# Patient Record
Sex: MALE | Race: WHITE | NOT HISPANIC OR LATINO | Employment: FULL TIME | URBAN - METROPOLITAN AREA
[De-identification: names, ages, dates, MRNs, and addresses within clinical notes are randomized per-mention and may not be internally consistent; named-entity substitution may affect disease eponyms.]

---

## 2019-04-15 ENCOUNTER — HOSPITAL ENCOUNTER (EMERGENCY)
Facility: MEDICAL CENTER | Age: 44
End: 2019-04-16
Attending: EMERGENCY MEDICINE
Payer: COMMERCIAL
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
## 2019-04-15 LAB
ALBUMIN SERPL BCP-MCNC: 3.9 G/DL (ref 3.2–4.9)
ALBUMIN/GLOB SERPL: 1.7 G/DL
ALP SERPL-CCNC: 57 U/L (ref 30–99)
ALT SERPL-CCNC: 27 U/L (ref 2–50)
ANION GAP SERPL CALC-SCNC: 10 MMOL/L (ref 0–11.9)
AST SERPL-CCNC: 23 U/L (ref 12–45)
BASOPHILS # BLD AUTO: 1.5 % (ref 0–1.8)
BASOPHILS # BLD: 0.15 K/UL (ref 0–0.12)
BILIRUB SERPL-MCNC: 0.4 MG/DL (ref 0.1–1.5)
BUN SERPL-MCNC: 9 MG/DL (ref 8–22)
CALCIUM SERPL-MCNC: 8.9 MG/DL (ref 8.5–10.5)
CHLORIDE SERPL-SCNC: 107 MMOL/L (ref 96–112)
CO2 SERPL-SCNC: 23 MMOL/L (ref 20–33)
CREAT SERPL-MCNC: 0.8 MG/DL (ref 0.5–1.4)
EOSINOPHIL # BLD AUTO: 0.18 K/UL (ref 0–0.51)
EOSINOPHIL NFR BLD: 1.7 % (ref 0–6.9)
ERYTHROCYTE [DISTWIDTH] IN BLOOD BY AUTOMATED COUNT: 50.8 FL (ref 35.9–50)
GLOBULIN SER CALC-MCNC: 2.3 G/DL (ref 1.9–3.5)
GLUCOSE SERPL-MCNC: 90 MG/DL (ref 65–99)
HCT VFR BLD AUTO: 52.3 % (ref 42–52)
HGB BLD-MCNC: 17.7 G/DL (ref 14–18)
IMM GRANULOCYTES # BLD AUTO: 0.03 K/UL (ref 0–0.11)
IMM GRANULOCYTES NFR BLD AUTO: 0.3 % (ref 0–0.9)
LYMPHOCYTES # BLD AUTO: 3.34 K/UL (ref 1–4.8)
LYMPHOCYTES NFR BLD: 32.4 % (ref 22–41)
MCH RBC QN AUTO: 33.5 PG (ref 27–33)
MCHC RBC AUTO-ENTMCNC: 33.8 G/DL (ref 33.7–35.3)
MCV RBC AUTO: 98.9 FL (ref 81.4–97.8)
MONOCYTES # BLD AUTO: 0.9 K/UL (ref 0–0.85)
MONOCYTES NFR BLD AUTO: 8.7 % (ref 0–13.4)
NEUTROPHILS # BLD AUTO: 5.71 K/UL (ref 1.82–7.42)
NEUTROPHILS NFR BLD: 55.4 % (ref 44–72)
NRBC # BLD AUTO: 0 K/UL
NRBC BLD-RTO: 0 /100 WBC
PLATELET # BLD AUTO: 229 K/UL (ref 164–446)
PMV BLD AUTO: 11.4 FL (ref 9–12.9)
POTASSIUM SERPL-SCNC: 3.6 MMOL/L (ref 3.6–5.5)
PROT SERPL-MCNC: 6.2 G/DL (ref 6–8.2)
RBC # BLD AUTO: 5.29 M/UL (ref 4.7–6.1)
SODIUM SERPL-SCNC: 140 MMOL/L (ref 135–145)
TROPONIN I SERPL-MCNC: <0.01 NG/ML (ref 0–0.04)
WBC # BLD AUTO: 10.3 K/UL (ref 4.8–10.8)

## 2019-04-15 PROCEDURE — 700102 HCHG RX REV CODE 250 W/ 637 OVERRIDE(OP): Performed by: EMERGENCY MEDICINE

## 2019-04-15 PROCEDURE — 700105 HCHG RX REV CODE 258: Performed by: EMERGENCY MEDICINE

## 2019-04-15 PROCEDURE — 80053 COMPREHEN METABOLIC PANEL: CPT

## 2019-04-15 PROCEDURE — A9270 NON-COVERED ITEM OR SERVICE: HCPCS | Performed by: EMERGENCY MEDICINE

## 2019-04-15 PROCEDURE — 93005 ELECTROCARDIOGRAM TRACING: CPT | Performed by: EMERGENCY MEDICINE

## 2019-04-15 PROCEDURE — 84484 ASSAY OF TROPONIN QUANT: CPT

## 2019-04-15 PROCEDURE — 85025 COMPLETE CBC W/AUTO DIFF WBC: CPT

## 2019-04-15 PROCEDURE — 99284 EMERGENCY DEPT VISIT MOD MDM: CPT

## 2019-04-15 RX ORDER — CEPHALEXIN 500 MG/1
500 CAPSULE ORAL ONCE
Status: COMPLETED | OUTPATIENT
Start: 2019-04-15 | End: 2019-04-15

## 2019-04-15 RX ORDER — CEPHALEXIN 500 MG/1
500 CAPSULE ORAL 4 TIMES DAILY
Qty: 40 CAP | Refills: 0 | Status: SHIPPED
Start: 2019-04-15 | End: 2020-02-04

## 2019-04-15 RX ORDER — SODIUM CHLORIDE 9 MG/ML
1000 INJECTION, SOLUTION INTRAVENOUS ONCE
Status: COMPLETED | OUTPATIENT
Start: 2019-04-15 | End: 2019-04-16

## 2019-04-15 RX ADMIN — CEPHALEXIN 500 MG: 500 CAPSULE ORAL at 23:07

## 2019-04-15 RX ADMIN — SODIUM CHLORIDE 1000 ML: 9 INJECTION, SOLUTION INTRAVENOUS at 23:07

## 2019-04-16 VITALS
HEART RATE: 70 BPM | BODY MASS INDEX: 25.74 KG/M2 | DIASTOLIC BLOOD PRESSURE: 62 MMHG | WEIGHT: 190.04 LBS | RESPIRATION RATE: 22 BRPM | OXYGEN SATURATION: 99 % | SYSTOLIC BLOOD PRESSURE: 104 MMHG | HEIGHT: 72 IN | TEMPERATURE: 96.6 F

## 2019-04-16 NOTE — ED NOTES
piv estb. Labs drawn & sent. Tolerated well. Flushed c 10cc ns. Family at bs. Tbs. Call light in reach.

## 2019-04-16 NOTE — ED TRIAGE NOTES
Pardeep Byrd  43 y.o.  Chief Complaint   Patient presents with   • Bug Bite     all over body, arms, legs and back and chest.  started last night.     • Malaise     has been feeling weak all day, had trouble standing up on own.      Patient wheeled in wc with son to triage room with above complaint.  Patient appears in mild discomfort.  Patient states he does not know what he was bit by but he did stay at a friends house and thinks that is where it came from.  Denies any bed bugs or scabies.  States the red raised bumps have been draining yellow drainage.  States he feels weak and nauseated from these bites.      Patient escorted to the lobby and instructed to notify staff of any changes in condition.

## 2019-04-16 NOTE — ED NOTES
piv dc. Bleeding controlled. Given dc inst & rx. verbalizes understanding. Ambulatory to lobby c steady gait c family.

## 2019-04-16 NOTE — ED PROVIDER NOTES
ED Provider Note    CHIEF COMPLAINT  Chief Complaint   Patient presents with   • Bug Bite     all over body, arms, legs and back and chest.  started last night.     • Malaise     has been feeling weak all day, had trouble standing up on own.        HPI  Pardeep Byrd is a 43 y.o. male here for evaluation of various bug bites, and general malaise.  Patient states that he noticed the bug bites earlier today after he woke up, and states that he has 4 5 on his back, 1 or 2 on his right arm, and a few scattered on his left arm.  Patient has no chest pain, shortness breath, or abdominal pain.  He states that these raised areas will itch, but that he has not taken anything for them.  Nothing alleviates or exacerbates his symptoms, and they are not painful.  Patient has not used any over-the-counter remedies as well.  He has no ill contacts, and no history of the same.    PAST MEDICAL HISTORY   has a past medical history of PUD (peptic ulcer disease).    SOCIAL HISTORY  Social History     Social History Main Topics   • Smoking status: Current Every Day Smoker     Packs/day: 1.00     Types: Cigarettes   • Smokeless tobacco: Former User   • Alcohol use Yes      Comment: drinks beer on the weekends   • Drug use: Yes     Types: Inhaled      Comment: marijuana   • Sexual activity: Not on file       SURGICAL HISTORY   has a past surgical history that includes other orthopedic surgery.    CURRENT MEDICATIONS  Home Medications     Reviewed by William Smith R.N. (Registered Nurse) on 04/15/19 at 2076  Med List Status: Complete   Medication Last Dose Status        Patient Yamil Taking any Medications                       ALLERGIES  Allergies   Allergen Reactions   • Bee    • Milk Digestant [Lactase-Rennet]        REVIEW OF SYSTEMS  See HPI for further details. Review of systems as above, otherwise all other systems are negative.     PHYSICAL EXAM  VITAL SIGNS: /62   Pulse 70   Temp 35.9 °C (96.6 °F)  (Temporal)   Resp (!) 22   Ht 1.829 m (6')   Wt 86.2 kg (190 lb 0.6 oz)   SpO2 99%   BMI 25.77 kg/m²     Constitutional: Well developed, well nourished. No acute distress.  HEENT: Normocephalic, atraumatic. MMM  Neck: Supple, Full range of motion   Chest/Pulmonary:  No respiratory distress.  Equal expansion   Musculoskeletal: No deformity, no edema, neurovascular intact.   Neuro: Clear speech, appropriate, cooperative, cranial nerves II-XII grossly intact.  Psych: Normal mood and affect  Skin; on the back, right arm, left arm, there are 3-5 2 cm raised erythematous areas, without vesicles.  Pruritic.  No extending erythema.  No induration.  No fluctuance    Results for orders placed or performed during the hospital encounter of 04/15/19   CBC WITH DIFFERENTIAL   Result Value Ref Range    WBC 10.3 4.8 - 10.8 K/uL    RBC 5.29 4.70 - 6.10 M/uL    Hemoglobin 17.7 14.0 - 18.0 g/dL    Hematocrit 52.3 (H) 42.0 - 52.0 %    MCV 98.9 (H) 81.4 - 97.8 fL    MCH 33.5 (H) 27.0 - 33.0 pg    MCHC 33.8 33.7 - 35.3 g/dL    RDW 50.8 (H) 35.9 - 50.0 fL    Platelet Count 229 164 - 446 K/uL    MPV 11.4 9.0 - 12.9 fL    Neutrophils-Polys 55.40 44.00 - 72.00 %    Lymphocytes 32.40 22.00 - 41.00 %    Monocytes 8.70 0.00 - 13.40 %    Eosinophils 1.70 0.00 - 6.90 %    Basophils 1.50 0.00 - 1.80 %    Immature Granulocytes 0.30 0.00 - 0.90 %    Nucleated RBC 0.00 /100 WBC    Neutrophils (Absolute) 5.71 1.82 - 7.42 K/uL    Lymphs (Absolute) 3.34 1.00 - 4.80 K/uL    Monos (Absolute) 0.90 (H) 0.00 - 0.85 K/uL    Eos (Absolute) 0.18 0.00 - 0.51 K/uL    Baso (Absolute) 0.15 (H) 0.00 - 0.12 K/uL    Immature Granulocytes (abs) 0.03 0.00 - 0.11 K/uL    NRBC (Absolute) 0.00 K/uL   COMP METABOLIC PANEL   Result Value Ref Range    Sodium 140 135 - 145 mmol/L    Potassium 3.6 3.6 - 5.5 mmol/L    Chloride 107 96 - 112 mmol/L    Co2 23 20 - 33 mmol/L    Anion Gap 10.0 0.0 - 11.9    Glucose 90 65 - 99 mg/dL    Bun 9 8 - 22 mg/dL    Creatinine 0.80 0.50 -  1.40 mg/dL    Calcium 8.9 8.5 - 10.5 mg/dL    AST(SGOT) 23 12 - 45 U/L    ALT(SGPT) 27 2 - 50 U/L    Alkaline Phosphatase 57 30 - 99 U/L    Total Bilirubin 0.4 0.1 - 1.5 mg/dL    Albumin 3.9 3.2 - 4.9 g/dL    Total Protein 6.2 6.0 - 8.2 g/dL    Globulin 2.3 1.9 - 3.5 g/dL    A-G Ratio 1.7 g/dL   TROPONIN   Result Value Ref Range    Troponin I <0.01 0.00 - 0.04 ng/mL   ESTIMATED GFR   Result Value Ref Range    GFR If African American >60 >60 mL/min/1.73 m 2    GFR If Non African American >60 >60 mL/min/1.73 m 2   EKG   Result Value Ref Range    Report       [facility] Emergency Dept.    Test Date:  2019-04-15  Pt Name:    FARAZ OH             Department: ER  MRN:        [id number]                      Room:       [facility]  Gender:     Male                         Technician: [id number]  :        1975                   Requested By:SHAN DASILVA  Order #:    [id number]                    Reading MD:    Measurements  Intervals                                Axis  Rate:       65                           P:          73  OK:         168                          QRS:        26  QRSD:       102                          T:          51  QT:         420  QTc:        437    Interpretive Statements  SINUS RHYTHM  LEFT ATRIAL ABNORMALITY  No previous ECG available for comparison        No orders to display     EKG; normal sinus rhythm at a rate of 65, no ST elevation or ST depression, no ectopy.  Normal intervals.    PROCEDURES     MEDICAL RECORD  I have reviewed patient's medical record and pertinent results are listed above.    COURSE & MEDICAL DECISION MAKING  I have reviewed any medical record information, laboratory studies and radiographic results as noted above.    I you have had any blood pressure issues while here in the emergency department, please see your doctor for a further evaluation or work up.    At this time, the patient be discharged home, and will be placed on antibiotics secondary  to the multiple bites with the various redness.  He is nontoxic-appearing, afebrile, and appears comfortable.  A workup was done regarding blood work and EKG because he felt general malaise and just not feeling well.  This shows no acute findings.    Differential diagnoses include but not limited to: Insect envenomation, cellulitis, MI,    This patient presents with insect envenomation.  At this time, I have counseled the patient/family regarding their medications, pain control, and follow up.  They will continue their medications, if any, as prescribed.  They will return immediately for any worsening symptoms and/or any other medical concerns.  They will see their doctor, or contact the doctor provided, in 1-2 days for follow up.       FINAL IMPRESSION  Insect envenomation      Electronically signed by: Johnnie Henriquez, 4/16/2019 12:27 AM

## 2019-04-16 NOTE — ED NOTES
Pt from triage via w/c. States sudden onset of malaise yesterday & sporatic bug bites after staying at friends house yesterday. Denies anyone else in house c similar bites. Changed into gown. Family at bs. Tbs.

## 2019-04-22 LAB — EKG IMPRESSION: NORMAL

## 2020-02-04 ENCOUNTER — HOSPITAL ENCOUNTER (EMERGENCY)
Facility: MEDICAL CENTER | Age: 45
End: 2020-02-04
Attending: EMERGENCY MEDICINE
Payer: COMMERCIAL

## 2020-02-04 ENCOUNTER — APPOINTMENT (OUTPATIENT)
Dept: RADIOLOGY | Facility: MEDICAL CENTER | Age: 45
End: 2020-02-04
Attending: EMERGENCY MEDICINE
Payer: COMMERCIAL

## 2020-02-04 VITALS
HEIGHT: 72 IN | TEMPERATURE: 97.9 F | BODY MASS INDEX: 24.84 KG/M2 | SYSTOLIC BLOOD PRESSURE: 115 MMHG | WEIGHT: 183.42 LBS | DIASTOLIC BLOOD PRESSURE: 77 MMHG | OXYGEN SATURATION: 100 % | HEART RATE: 95 BPM | RESPIRATION RATE: 20 BRPM

## 2020-02-04 DIAGNOSIS — M54.6 ACUTE MIDLINE THORACIC BACK PAIN: ICD-10-CM

## 2020-02-04 LAB
ANION GAP SERPL CALC-SCNC: 13 MMOL/L (ref 0–11.9)
BUN SERPL-MCNC: 9 MG/DL (ref 8–22)
CALCIUM SERPL-MCNC: 9.6 MG/DL (ref 8.5–10.5)
CHLORIDE SERPL-SCNC: 103 MMOL/L (ref 96–112)
CO2 SERPL-SCNC: 21 MMOL/L (ref 20–33)
CREAT SERPL-MCNC: 0.89 MG/DL (ref 0.5–1.4)
GLUCOSE SERPL-MCNC: 94 MG/DL (ref 65–99)
POTASSIUM SERPL-SCNC: 3.9 MMOL/L (ref 3.6–5.5)
SODIUM SERPL-SCNC: 137 MMOL/L (ref 135–145)

## 2020-02-04 PROCEDURE — A9270 NON-COVERED ITEM OR SERVICE: HCPCS | Performed by: EMERGENCY MEDICINE

## 2020-02-04 PROCEDURE — 72072 X-RAY EXAM THORAC SPINE 3VWS: CPT

## 2020-02-04 PROCEDURE — 700102 HCHG RX REV CODE 250 W/ 637 OVERRIDE(OP): Performed by: EMERGENCY MEDICINE

## 2020-02-04 PROCEDURE — 99283 EMERGENCY DEPT VISIT LOW MDM: CPT

## 2020-02-04 PROCEDURE — 80048 BASIC METABOLIC PNL TOTAL CA: CPT

## 2020-02-04 RX ORDER — CYCLOBENZAPRINE HCL 10 MG
10 TABLET ORAL ONCE
Status: COMPLETED | OUTPATIENT
Start: 2020-02-04 | End: 2020-02-04

## 2020-02-04 RX ORDER — IBUPROFEN 200 MG
200 TABLET ORAL EVERY 6 HOURS PRN
COMMUNITY
End: 2020-05-03

## 2020-02-04 RX ORDER — CYCLOBENZAPRINE HCL 10 MG
10 TABLET ORAL 3 TIMES DAILY PRN
Qty: 30 TAB | Refills: 0 | Status: SHIPPED | OUTPATIENT
Start: 2020-02-04 | End: 2020-05-03

## 2020-02-04 RX ORDER — OXYCODONE HYDROCHLORIDE 5 MG/1
5 TABLET ORAL ONCE
Status: COMPLETED | OUTPATIENT
Start: 2020-02-04 | End: 2020-02-04

## 2020-02-04 RX ADMIN — CYCLOBENZAPRINE 10 MG: 10 TABLET, FILM COATED ORAL at 16:12

## 2020-02-04 RX ADMIN — OXYCODONE HYDROCHLORIDE 5 MG: 5 TABLET ORAL at 16:12

## 2020-02-04 NOTE — ED NOTES
Patient reports that he's been having ongoing back pain for the last month. No specific back injury, but is a retired .

## 2020-02-04 NOTE — ED TRIAGE NOTES
.  Chief Complaint   Patient presents with   • Back Pain     mid back pain x 1 month     ./77   Pulse 95   Temp 36.6 °C (97.9 °F) (Temporal)   Resp 20   Ht 1.829 m (6')   Wt 83.2 kg (183 lb 6.8 oz)   SpO2 100%   BMI 24.88 kg/m²     Ambulatory to triage with above complaints, taking Advil at home with no relief, educated on triage process, placed in lobby, told to inform staff of any changes in condition.

## 2020-02-05 NOTE — ED PROVIDER NOTES
ED Provider Note    Scribed for Chuckie Sylvester M.D. by Db Martínez. 2/4/2020  4:03 PM    Primary care provider: Pcp Pt States None  Means of arrival: Walk-in  History obtained from: Patient  History limited by: None    CHIEF COMPLAINT  Chief Complaint   Patient presents with   • Back Pain     mid back pain x 1 month       HPI  Pardeep Byrd is a 44 y.o. male who presents to the Emergency Department complaining of back pain that began about one month ago and worsened over the last week. He believes the pain is related to lifting heavy objects. He has been ambulating about in a hunched position. He has been taking ibuprofen for the pain and last took 3200 mg this morning and states he has been taking up to 6400 mg a day. He denies any abdominal pain, hematuria, diarrhea, or constipation.    REVIEW OF SYSTEMS  Pertinent positives include back pain. Pertinent negatives include no abdominal pain, hematuria, diarrhea, or constipation. All other systems negative.    PAST MEDICAL HISTORY   has a past medical history of PUD (peptic ulcer disease).    SURGICAL HISTORY   has a past surgical history that includes other orthopedic surgery.    SOCIAL HISTORY  Social History     Tobacco Use   • Smoking status: Current Every Day Smoker     Packs/day: 1.00     Types: Cigarettes   • Smokeless tobacco: Former User   Substance Use Topics   • Alcohol use: Yes     Comment: drinks beer on the weekends   • Drug use: Yes     Types: Inhaled     Comment: marijuana      Social History     Substance and Sexual Activity   Drug Use Yes   • Types: Inhaled    Comment: marijuana       FAMILY HISTORY  History reviewed. No pertinent family history.    CURRENT MEDICATIONS  Home Medications     Reviewed by Rosario Sylvester R.N. (Registered Nurse) on 02/04/20 at 1539  Med List Status: Partial   Medication Last Dose Status   ibuprofen (MOTRIN) 200 MG Tab  Active                ALLERGIES  Allergies   Allergen Reactions   • Bee     • Milk Digestant [Lactase-Rennet]        PHYSICAL EXAM  VITAL SIGNS: /77   Pulse 95   Temp 36.6 °C (97.9 °F) (Temporal)   Resp 20   Ht 1.829 m (6')   Wt 83.2 kg (183 lb 6.8 oz)   SpO2 100%   BMI 24.88 kg/m²     Constitutional: Well developed, Well nourished, Mild distress, Non-toxic appearance.   HENT: Normocephalic, Atraumatic, Bilateral external ears normal, Oropharynx moist, No oral exudates.   Eyes: PERRLA, EOMI, Conjunctiva normal, No discharge.   Neck: No tenderness, Supple, No stridor.   Lymphatic: No lymphadenopathy noted.   Cardiovascular: Normal heart rate, Normal rhythm.   Thorax & Lungs: Clear to auscultation bilaterally, No respiratory distress, No wheezing, No crackles.   Skin: Warm, Dry, No erythema, No rash.   Extremities:, No edema No cyanosis.   Musculoskeletal: No Diffuse tenderness throughout the lower T spine and paraspinal musculature. major deformities noted.  Intact distal pulses  Neurologic: Awake, alert. Moves all extremities spontaneously.  Psychiatric: Affect normal, Judgment normal, Mood normal.       LABS  Results for orders placed or performed during the hospital encounter of 02/04/20   Basic Metabolic Panel   Result Value Ref Range    Sodium 137 135 - 145 mmol/L    Potassium 3.9 3.6 - 5.5 mmol/L    Chloride 103 96 - 112 mmol/L    Co2 21 20 - 33 mmol/L    Glucose 94 65 - 99 mg/dL    Bun 9 8 - 22 mg/dL    Creatinine 0.89 0.50 - 1.40 mg/dL    Calcium 9.6 8.5 - 10.5 mg/dL    Anion Gap 13.0 (H) 0.0 - 11.9   ESTIMATED GFR   Result Value Ref Range    GFR If African American >60 >60 mL/min/1.73 m 2    GFR If Non African American >60 >60 mL/min/1.73 m 2         RADIOLOGY  DX-THORACIC SPINE-WITH SWIMMERS VIEW   Final Result         1.  No fracture      2.  Mild scoliosis and spondylosis        The radiologist's interpretation of all radiological studies have been reviewed by me.    COURSE & MEDICAL DECISION MAKING  Nursing notes, VS, PMSFHx reviewed in chart.    4:03 PM - Patient  seen and examined at bedside. Patient will be treated with cyclobenzaprine 10 mg and Roxicodone 5 mg. Ordered DX thoracic spine with swimmers view, estimated GFR, and BMP to evaluate his symptoms.    Decision Making:  Patient with mid thoracic back pain, likely muscle strain, has been taking ibuprofen, stated that he took ibuprofen, 3200 mg at once this morning.  BMP was unremarkable, give the patient in oxycodone, Flexeril, x-ray was unremarkable.  We will discharge the patient home, give the patient a prescription for Flexeril, have the patient return with worsening symptoms.      DISPOSITION:   The patient will return for new or worsening symptoms and is stable at the time of discharge.    The patient is referred to a primary physician for blood pressure management, diabetic screening, and for all other preventative health concerns.        DISPOSITION:  Patient will be discharged home in stable condition.    FOLLOW UP:  [facility], Emergency Dept  [address] [facility] [id number] [phone]    If symptoms worsen    [address] [address]  [phone]          OUTPATIENT MEDICATIONS:  Discharge Medication List as of 2/4/2020  5:34 PM      START taking these medications    Details   cyclobenzaprine (FLEXERIL) 10 MG Tab Take 1 Tab by mouth 3 times a day as needed., Disp-30 Tab, R-0, Print Rx Paper               FINAL IMPRESSION  1. Acute midline thoracic back pain          Db AGUILAR (Scribe), am scribing for, and in the presence of, Chuckie Sylvester M.D..    Electronically signed by: Db Martínez (Vandana), 2/4/2020    Chuckie AGUILAR M.D. personally performed the services described in this documentation, as scribed by Db Martínez in my presence, and it is both accurate and complete.    C    The note accurately reflects work and decisions made by me.  Chuckie Sylvester M.D.  2/4/2020  5:39 PM

## 2020-05-03 ENCOUNTER — HOSPITAL ENCOUNTER (OUTPATIENT)
Dept: RADIOLOGY | Facility: MEDICAL CENTER | Age: 45
End: 2020-05-03
Attending: FAMILY MEDICINE
Payer: COMMERCIAL

## 2020-05-03 ENCOUNTER — OFFICE VISIT (OUTPATIENT)
Dept: URGENT CARE | Facility: PHYSICIAN GROUP | Age: 45
End: 2020-05-03

## 2020-05-03 VITALS
DIASTOLIC BLOOD PRESSURE: 78 MMHG | BODY MASS INDEX: 23.7 KG/M2 | RESPIRATION RATE: 20 BRPM | SYSTOLIC BLOOD PRESSURE: 128 MMHG | TEMPERATURE: 98.7 F | HEART RATE: 100 BPM | WEIGHT: 175 LBS | HEIGHT: 72 IN | OXYGEN SATURATION: 95 %

## 2020-05-03 DIAGNOSIS — S49.92XA INJURY OF LEFT SHOULDER, INITIAL ENCOUNTER: ICD-10-CM

## 2020-05-03 DIAGNOSIS — M75.32 CALCIFIC TENDINITIS OF LEFT SHOULDER: ICD-10-CM

## 2020-05-03 PROCEDURE — 99203 OFFICE O/P NEW LOW 30 MIN: CPT | Performed by: FAMILY MEDICINE

## 2020-05-03 PROCEDURE — 73030 X-RAY EXAM OF SHOULDER: CPT | Mod: LT

## 2020-05-03 RX ORDER — IBUPROFEN 200 MG
800 TABLET ORAL ONCE
Status: COMPLETED | OUTPATIENT
Start: 2020-05-03 | End: 2020-05-03

## 2020-05-03 RX ADMIN — Medication 800 MG: at 13:36

## 2020-05-03 ASSESSMENT — FIBROSIS 4 INDEX: FIB4 SCORE: 0.87

## 2020-05-03 NOTE — PATIENT INSTRUCTIONS
On x-ray it showed you have calcific tendinitis which is a chronic condition and based on your recent injury would recommend further evaluation by sports medicine and referral was placed    Sling for comfort only  Ibuprofen 600 mg every 8 hours as needed for pain  Our coordinator should be in touch with you early next week for the sports medicine.  If you have not heard back from them by Wednesday call [phone]      Calcific Tendinitis  Calcific tendinitis occurs when crystals of calcium are deposited in a tendon. Tendons are tough, cord-like tissues that connect muscle to bone. Tendons are an important part of joints. They make joints move, and they absorb some of the stress that a joint receives during use. When calcium is deposited in the tendon, the tendon becomes stiff and painful and it can become swollen. Calcific tendinitis occurs frequently in a tendon in the shoulder joint (rotator cuff).  What are the causes?  The cause of calcific tendinitis is not known. It may be associated with:  · Overusing a tendon, such as from repetitive motion.  · Excess stress on the tendon.  · Age-related wear and tear.  · Repetitive, mild injuries.  What increases the risk?  This condition is more likely to develop in:  · People who do activities that involve repetitive motions.  · Older people.  What are the signs or symptoms?  This condition may or may not be painful. If there is pain, it may occur when moving the joint. Other symptoms may include:  · Tenderness when pressure is applied to the tendon.  · A snapping or popping sound when the joint moves.  · Decreased motion of the joint.  · Difficulty sleeping due to pain in the joint.  How is this diagnosed?  This condition is diagnosed with a physical exam. You may also have tests, such as:  · X-rays.  · MRI.  · CT scan.  How is this treated?  This condition generally gets better on its own. Treatment may also include:  · Resting, icing, applying pressure (compression),  and raising (elevating) the area above the level of your heart. This is known as RICE therapy.  · Medicines to help reduce inflammation or to help reduce pain.  · Physical therapy to strengthen and stretch the tendon.  · Following a specific exercise program to keep the joint working properly.  Treatment for more severe calcific tendinitis may require:  · Injecting steroids or pain-relieving medicines into or around the joint.  · Manipulating the joint after you are given medicine to numb the area (local anesthetic).  · Inflating the joint with sterile fluid to increase the flexibility of the tendons.  · Shock wave therapy, which involves focusing sound waves on the joint.  If other treatments do not work, surgery may be done to clean out the calcium deposits and repair the tendon as needed. Most people do not need surgery.  Follow these instructions at home:  Managing pain, stiffness, and swelling  · If directed, put heat on the affected area before you exercise or as often as told by your health care provider. Use the heat source that your health care provider recommends, such as a moist heat pack or a heating pad.  ¨ Place a towel between your skin and the heat source.  ¨ Leave the heat on for 20-30 minutes.  ¨ Remove the heat if your skin turns bright red. This is especially important if you are unable to feel pain, heat, or cold. You may have a greater risk of getting burned.  · Move the fingers or toes of the affected limb often, if this applies. This can help to prevent stiffness and lessen swelling.  · If directed, elevate the affected area above the level of your heart while you are sitting or lying down.  · If directed, put ice on the affected area:  ¨ Put ice in a plastic bag.  ¨ Place a towel between your skin and the bag.  ¨ Leave the ice on for 20 minutes, 2-3 times a day.  General instructions  · Take over-the-counter and prescription medicines only as told by your health care provider.  · Do not  drive or use heavy machinery while taking prescription pain medicine.  · Follow recommendations from your health care provider for activity and exercise. Ask your health care provider what activities are safe for you.  · Avoid using the affected area while you are experiencing symptoms of tendinitis.  · Wear an elastic bandage or compression wrap only as told by your health care provider.  · Keep all follow-up visits as told by your health care provider. This is important.  Contact a health care provider if:  · You have pain or numbness that gets worse.  · You develop new weakness.  · You notice increased joint stiffness or a sensation of looseness in the joint.  · You notice increasing redness, swelling, or warmth around the joint area.  Get help right away if:  · You have a fever for more than 2-3 days.  · You have symptoms for more than 2-3 days.  · You have a fever and your symptoms suddenly get worse.  This information is not intended to replace advice given to you by your health care provider. Make sure you discuss any questions you have with your health care provider.  Document Released: 09/26/2009 Document Revised: 09/11/2017 Document Reviewed: 09/11/2017  Elsevier Interactive Patient Education © 2017 [employer]
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
## 2020-11-11 ENCOUNTER — APPOINTMENT (OUTPATIENT)
Dept: RADIOLOGY | Facility: IMAGING CENTER | Age: 45
End: 2020-11-11
Attending: NURSE PRACTITIONER
Payer: COMMERCIAL

## 2020-11-11 ENCOUNTER — OCCUPATIONAL MEDICINE (OUTPATIENT)
Dept: URGENT CARE | Facility: CLINIC | Age: 45
End: 2020-11-11
Payer: COMMERCIAL

## 2020-11-11 VITALS
DIASTOLIC BLOOD PRESSURE: 82 MMHG | HEART RATE: 77 BPM | OXYGEN SATURATION: 97 % | BODY MASS INDEX: 26.41 KG/M2 | SYSTOLIC BLOOD PRESSURE: 144 MMHG | RESPIRATION RATE: 17 BRPM | TEMPERATURE: 97.7 F | HEIGHT: 72 IN | WEIGHT: 195 LBS

## 2020-11-11 DIAGNOSIS — M25.531 WRIST PAIN, ACUTE, RIGHT: ICD-10-CM

## 2020-11-11 DIAGNOSIS — S63.501A WRIST SPRAIN, RIGHT, INITIAL ENCOUNTER: ICD-10-CM

## 2020-11-11 PROCEDURE — 73110 X-RAY EXAM OF WRIST: CPT | Mod: TC,RT | Performed by: NURSE PRACTITIONER

## 2020-11-11 PROCEDURE — 99214 OFFICE O/P EST MOD 30 MIN: CPT | Performed by: NURSE PRACTITIONER

## 2020-11-11 RX ORDER — IBUPROFEN 200 MG
600 TABLET ORAL ONCE
OUTPATIENT
Start: 2020-11-11 | End: 2020-11-12

## 2020-11-11 ASSESSMENT — ENCOUNTER SYMPTOMS
BACK PAIN: 0
MYALGIAS: 1
NAUSEA: 0
FALLS: 0
CHILLS: 0
BRUISES/BLEEDS EASILY: 0
DIZZINESS: 0
VOMITING: 0
NECK PAIN: 0
FEVER: 0
HEADACHES: 0

## 2020-11-11 ASSESSMENT — FIBROSIS 4 INDEX: FIB4 SCORE: 0.89

## 2020-11-11 NOTE — LETTER
[facility] [address] [place] [address]  Phone:  [phone] - Fax:  [phone]   [facility] Progress Report and Disability Certification  Date of Service: 11/11/2020   No Show:  No  Date / Time of Next Visit: 11/18/2020 @ 8:00AM [facility]   Claim Information   Patient Name: Pardeep Byrd  Claim Number:     Employer:   [employer] Date of Injury: 11/11/2020     Insurer / TPA: Misc Unknown ID / SSN:     Occupation:   Diagnosis: Diagnoses of Wrist pain, acute, right and Wrist sprain, right, initial encounter were pertinent to this visit.    Medical Information   Related to Industrial Injury? Yes    Subjective Complaints:  Date of injury 11/11/2020. Pt presents for evaluation of a new work comp problem.  He states that he was putting the front end of a truck back on and pulled on a bolt holding sway bar.  He states when he pulled the bolt, the sway bar came down and smashed  his dorsal right wrist.  He notes immediate pain and inability to rotate right wrist.  Pain level currently is 7/10.  Negative for numbness or tingling.  He denies previous injury to this wrist.  Negative for her second job.   Objective Findings: usculoskeletal:      Right wrist: He exhibits decreased range of motion, tenderness, bony tenderness and swelling. He exhibits no effusion, no crepitus, no deformity and no laceration.      Comments: Positive for small superficial abrasion to right palm.  Negative for bleeding or drainage.   Pre-Existing Condition(s): none   Assessment:   Initial Visit    Status: Additional Care Required  Permanent Disability:No    Plan:      Diagnostics: X-ray    Comments:    DX wrist right: IMPRESSION:     No radiographic evidence of acute traumatic bone injury.    Disability Information   Status:      From:  11/11/2020  Through: 11/18/2020 Restrictions are: Temporary   Physical Restrictions   Sitting:    Standing:    Stooping:   Bending:      Squatting:    Walking:    Climbing:    Pushing:      Pulling:    Other:    Reaching Above Shoulder (L):   Reaching Above Shoulder (R):       Reaching Below Shoulder (L):    Reaching Below Shoulder (R):      Not to exceed Weight Limits   Carrying(hrs): 0  Comments:right upper extremity  Weight Limit(lb):   Comments:right upper extremety 0lbs Lifting(hrs):   Weight  Limit(lb):     Comments: Wrist brace provided in office.  We discussed supportive measures including resting, icing, compressing elevating right wrist for relief of pain. Pt instructed to use Tylenol 500 mg every four hours or Ibuprofen 600 mg every six hours as needed for relief of pain.  He was given 600 mg of ibuprofen in office.  Patient to follow-up in 1 week.  Work restrictions provided. AVS handout given and reviewed with patient. Pt educated on red flags and when to seek treatment back in ER or UC.       Repetitive Actions   Hands: i.e. Fine Manipulations from [place] hrs/day  Comments:right hand   Feet: i.e. Operating Foot Controls:     Driving / Operate Machinery:     Provider Name:   RIKA Chin Physician Signature:  Physician Name:     Clinic Name / Location: [address] [place] [address] [place] [address] Clinic Phone Number: Dept: [phone]   Appointment Time: 3:15 Pm Visit Start Time: 4:31 PM   Check-In Time:  3:08 Pm Visit Discharge Time:  5:35PM   Original-Treating Physician or Chiropractor    Page 2-Insurer/TPA    Page 3-Employer    Page 4-Employee

## 2020-11-11 NOTE — LETTER
EMPLOYEE’S CLAIM FOR COMPENSATION/ REPORT OF INITIAL TREATMENT  FORM C-4    EMPLOYEE’S CLAIM - PROVIDE ALL INFORMATION REQUESTED   First Name  Pardeep Last Name  Rochelle Birthdate                    1975                Sex  male Claim Number   Home Address  [address] Age  45 y.o. Height  1.829 m (6') Weight  88.5 kg (195 lb) [facility]     [facility] Zip  [address] Telephone  [phone] (home)    Mailing Address  [address] Zip  [address] Primary Language Spoken  English    Insurer  Unknown Third Party   Misc Unknown   Employee's Occupation (Job Title) When Injury or Occupational Disease Occurred      Employer's Name    [employer] Telephone   [phone]   Employer Address   [address] [facility] Zip   [address]   Date of Injury  11/11/2020               Hour of Injury  11:20 AM Date Employer Notified  11/11/2020 Last Day of Work after Injury     or Occupational Disease  11/11/2020 Supervisor to Whom Injury     Reported  [facility]   Address or Location of Accident (if applicable)  [[place] ]   What were you doing at the time of accident? (if applicable)  Putting struts on a [employer]    How did this injury or occupational disease occur? (Be specific an answer in detail. Use additional sheet if necessary)  Trying to put saw  on, the bolt was hung up on sway bar, I used my R hand to push the bolt through the eyelet of the sway bar and the sway bar came down and smacked the inside palm and wrist.   If you believe that you have an occupational disease, when did you first have knowledge of the disability and it relationship to your employment?  N/A Witnesses to the Accident  None      Nature of Injury or Occupational Disease  Defer  Part(s) of Body Injured or Affected  Wrist (R) and Hand (R), ,     I certify that the above is true and correct to the best of my knowledge and  that I have provided this information in order to obtain the benefits of [place]  I hereby authorize any physician, chiropractor, surgeon, practitioner, or other person, any hospital, including [facility] or [facility] hospital, any medical service organization, any insurance company, or other institution or organization to release to each other, any medical or other information, including benefits paid or payable, pertinent to this injury or disease, except information relative to diagnosis, treatment and/or counseling for AIDS, psychological conditions, alcohol or controlled substances, for which I must give specific authorization.  A Photostat of this authorization shall be as valid as the original.     Date   Place   Employee’s Signature   THIS REPORT MUST BE COMPLETED AND MAILED WITHIN 3 WORKING DAYS OF TREATMENT   Place  [facility]  Name of Facility  [facility]   Date  11/11/2020 Diagnosis  (M25.531) Wrist pain, acute, right  (S63.501A) Wrist sprain, right, initial encounter Is there evidence the injured employee was under the              influence of alcohol and/or another controlled substance at the time of accident?   Hour  4:31 PM Description of Injury or Disease  Diagnoses of Wrist pain, acute, right and Wrist sprain, right, initial encounter were pertinent to this visit. No   Treatment  Wrist brace provided in office.  We discussed supportive measures including resting, icing, compressing elevating right wrist for relief of pain. Pt instructed to use Tylenol 500 mg every four hours or Ibuprofen 600 mg every six hours as needed for relief of pain.  He was given 600 mg of ibuprofen in office.  Patient to follow-up in 1 week.  Work restrictions provided. AVS handout given and reviewed with patient. Pt educated on red flags and when to seek treatment back in ER or UC.   Okay can  "a GI cocktail  Have you advised the patient to remain off work five days or     more? No   X-Ray Findings  Negative   If Yes   From Date  To Date      From information given by the employee, together with medical evidence, can you directly connect this injury or occupational disease as job incurred?  Yes If No Full Duty    No Modified Duty  Yes   Is additional medical care by a physician indicated?  Yes If Modified Duty, Specify any Limitations / Restrictions  No lifting think you/ 0lb weight restriction right arm, no fine movements right wrist.    Do you know of any previous injury or disease contributing to this condition or occupational disease?                            No   Date  11/11/2020 Print Doctor’s Name   RIKA Chin I certify the employer’s copy of  this form was mailed on:   Address  [address] Insurer’s Use Only     [place] [facility]  [address]    Provider’s Tax ID Number  [id number] Telephone  Dept: [phone]         Signature:     Degree          ORIGINAL-TREATING PHYSICIAN OR CHIROPRACTOR    PAGE 2-INSURER/TPA    PAGE 3-EMPLOYER    PAGE 4-EMPLOYEE        Form C-4 (rev.10/07)          BRIEF DESCRIPTION OF RIGHTS AND BENEFITS  (Pursuant to [place] 616[place])    Notice of Injury or Occupational Disease (Incident Report Form C-1): If an injury or occupational disease (OD) arises out of and in the course of employment, you must provide written notice to your employer as soon as practicable, but no later than 7 days after the accident or OD. Your employer shall maintain a sufficient supply of the required forms.     Claim for Compensation (Form C-4): If medical treatment is sought, the form C-4 is available at the place of initial treatment. A completed \"Claim for Compensation\" (Form C-4) must be filed within 90 days after an accident or OD. The treating physician or chiropractor must, within 3 working days after treatment, complete and mail to the employer, the employer's " insurer and third-party , the Claim for Compensation.     Medical Treatment: If you require medical treatment for your on-the-job injury or OD, you may be required to select a physician or chiropractor from a list provided by your workers’ compensation insurer, if it has contracted with an Organization for Managed Care (MCO) or Preferred Provider Organization (PPO) or providers of health care. If your employer has not entered into a contract with an MCO or PPO, you may select a physician or chiropractor from the Panel of Physicians and Chiropractors. Any medical costs related to your industrial injury or OD will be paid by your insurer.     Temporary Total Disability (TTD): If your doctor has certified that you are unable to work for a period of at least 5 consecutive days, or 5 cumulative days in a 20-day period, or places restrictions on you that your employer does not accommodate, you may be entitled to TTD compensation.     Temporary Partial Disability (TPD): If the wage you receive upon reemployment is less than the compensation for TTD to which you are entitled, the insurer may be required to pay you TPD compensation to make up the difference. TPD can only be paid for a maximum of 24 months.     Permanent Partial Disability (PPD): When your medical condition is stable and there is an indication of a PPD as a result of your injury or OD, within 30 days, your insurer must arrange for an evaluation by a rating physician or chiropractor to determine the degree of your PPD. The amount of your PPD award depends on the date of injury, the results of the PPD evaluation and your age and wage.     Permanent Total Disability (PTD): If you are medically certified by a treating physician or chiropractor as permanently and totally disabled and have been granted a PTD status by your insurer, you are entitled to receive monthly benefits not to exceed 66 2/3% of your average monthly wage. The amount of your PTD  payments is subject to reduction if you previously received a PPD award.     Vocational Rehabilitation Services: You may be eligible for vocational rehabilitation services if you are unable to return to the job due to a permanent physical impairment or permanent restrictions as a result of your injury or occupational disease.     Transportation and Per Anne Reimbursement: You may be eligible for travel expenses and per anne associated with medical treatment.     Reopening: You may be able to reopen your claim if your condition worsens after claim closure.     Appeal Process: If you disagree with a written determination issued by the insurer or the insurer does not respond to your request, you may appeal to the Department of Administration, , by following the instructions contained in your determination letter. You must appeal the determination within 70 days from the date of the determination letter at [address], [address], [place], [place] [address], or [address], [address], [place] [address]. If you disagree with the  decision, you may appeal to the Department of Administration, . You must file your appeal within 30 days from the date of the  decision letter at [address], [address], [place], [place] [address], or [address], [address], [place], [place] [address]. If you disagree with a decision of an , you may file a petition for judicial review with the District Court. You must do so within 30 days of the Appeal Officer’s decision. You may be represented by an  at your own expense or you may contact the [facility] for possible representation.     [place]  for Injured Workers (NAIW): If you disagree with a  decision, you may request that NAIW represent you without charge at an  Hearing. For information regarding denial of benefits, you may contact the  OMAR at: [address], [address], [place] [address], [phone], or [address], [place] [address], [phone]     To File a Complaint with the Division: If you wish to file a complaint with the  of the Division of Industrial Relations (DIR), please contact the Workers’ Compensation Section, [address], [address], [place], [place] [address], telephone (721) 764-2532, or [address], [address], [place], [place] [address], telephone [phone].     For assistance with Workers’ Compensation Issues: You may contact the Office of the Governor Consumer Health Assistance, [address] [place] [address], [address], [place], [place] [address], Toll Free 1-160.816.8109, Web site: http://govcha.[facility].nv., E-mail [email or web address].              __________________________________________________________________                              ___________________         Employee Name / Signature                                                                                                                     Date                                                                                                                                                                                       D-2 (rev. 01/20)

## 2020-11-12 NOTE — PROGRESS NOTES
Subjective:     Pardeep Byrd is a 45 y.o. male who presents for Work-Related Injury (WC   bar smacked right wrist/hand/thumb at 11:20 this morning)      HPI  Date of injury 11/11/2020. Pt presents for evaluation of a new work comp problem.  He states that he was putting the front end of a truck back on and pulled on a bolt holding sway bar.  He states when he pulled the bolt, the sway bar came down and smashed  his dorsal right wrist.  He notes immediate pain and inability to rotate right wrist.  Pain level currently is 7/10.  Negative for numbness or tingling.  He denies previous injury to this wrist.  Negative for her second job.    Review of Systems   Constitutional: Negative for chills, fever and malaise/fatigue.   Gastrointestinal: Negative for nausea and vomiting.   Musculoskeletal: Positive for joint pain and myalgias. Negative for back pain, falls and neck pain.   Neurological: Negative for dizziness and headaches.   Endo/Heme/Allergies: Does not bruise/bleed easily.   All other systems reviewed and are negative.      PMH:   Past Medical History:   Diagnosis Date   • PUD (peptic ulcer disease)      ALLERGIES:   Allergies   Allergen Reactions   • Bee    • Milk Digestant [Lactase-Rennet]      SURGHX:   Past Surgical History:   Procedure Laterality Date   • OTHER ORTHOPEDIC SURGERY      Knee surg Partial amp RT middle finger     SOCHX:   Social History     Socioeconomic History   • Marital status: Single     Spouse name: Not on file   • Number of children: Not on file   • Years of education: Not on file   • Highest education level: Not on file   Occupational History   • Not on file   Social Needs   • Financial resource strain: Not on file   • Food insecurity     Worry: Not on file     Inability: Not on file   • Transportation needs     Medical: Not on file     Non-medical: Not on file   Tobacco Use   • Smoking status: Current Every Day Smoker     Packs/day: 1.00     Types: Cigarettes   •  Smokeless tobacco: Former User   Substance and Sexual Activity   • Alcohol use: Yes     Comment: drinks beer on the weekends   • Drug use: Yes     Types: Inhaled     Comment: marijuana   • Sexual activity: Not on file   Lifestyle   • Physical activity     Days per week: Not on file     Minutes per session: Not on file   • Stress: Not on file   Relationships   • Social connections     Talks on phone: Not on file     Gets together: Not on file     Attends Sabianism service: Not on file     Active member of club or organization: Not on file     Attends meetings of clubs or organizations: Not on file     Relationship status: Not on file   • Intimate partner violence     Fear of current or ex partner: Not on file     Emotionally abused: Not on file     Physically abused: Not on file     Forced sexual activity: Not on file   Other Topics Concern   • Not on file   Social History Narrative   • Not on file     FH: History reviewed. No pertinent family history.      Objective:   /82   Pulse 77   Temp 36.5 °C (97.7 °F) (Temporal)   Resp 17   Ht 1.829 m (6')   Wt 88.5 kg (195 lb)   SpO2 97%   BMI 26.45 kg/m²     Physical Exam  Vitals signs and nursing note reviewed.   Constitutional:       General: He is not in acute distress.     Appearance: Normal appearance. He is not ill-appearing.   HENT:      Head: Normocephalic and atraumatic.      Right Ear: External ear normal.      Left Ear: External ear normal.      Nose: No congestion or rhinorrhea.      Mouth/Throat:      Mouth: Mucous membranes are moist.   Eyes:      Extraocular Movements: Extraocular movements intact.      Pupils: Pupils are equal, round, and reactive to light.   Neck:      Musculoskeletal: Normal range of motion and neck supple.   Cardiovascular:      Rate and Rhythm: Normal rate and regular rhythm.      Pulses: Normal pulses.      Heart sounds: Normal heart sounds.   Pulmonary:      Effort: Pulmonary effort is normal.      Breath sounds: Normal  breath sounds.   Abdominal:      General: Abdomen is flat. Bowel sounds are normal.      Palpations: Abdomen is soft.   Musculoskeletal:      Right wrist: He exhibits decreased range of motion, tenderness, bony tenderness and swelling. He exhibits no effusion, no crepitus, no deformity and no laceration.      Comments: Positive for small superficial abrasion to right palm.  Negative for bleeding or drainage.   Skin:     General: Skin is warm and dry.      Capillary Refill: Capillary refill takes less than 2 seconds.   Neurological:      General: No focal deficit present.      Mental Status: He is alert and oriented to person, place, and time. Mental status is at baseline.   Psychiatric:         Mood and Affect: Mood normal.         Behavior: Behavior normal.         Thought Content: Thought content normal.         Judgment: Judgment normal.       DX wrist right: IMPRESSION:     No radiographic evidence of acute traumatic bone injury.  Assessment/Plan:   Assessment    1. Wrist pain, acute, right  DX-WRIST-COMPLETE 3+ RIGHT    ibuprofen (MOTRIN) tablet 600 mg   2. Wrist sprain, right, initial encounter         Wrist brace provided in office.  We discussed supportive measures including resting, icing, compressing elevating right wrist for relief of pain. Pt instructed to use Tylenol 500 mg every four hours or Ibuprofen 600 mg every six hours as needed for relief of pain.  He was given 600 mg of ibuprofen in office.  Patient to follow-up in 1 week.  Work restrictions provided. AVS handout given and reviewed with patient. Pt educated on red flags and when to seek treatment back in ER or UC.

## 2020-11-12 NOTE — PATIENT INSTRUCTIONS
Wrist Sprain, Adult  A wrist sprain is a stretch or tear in the strong, fibrous tissues (ligaments) that connect your wrist bones. There are three types of wrist sprains:  · Grade 1. In this type of sprain, the ligament is stretched more than normal.  · Grade 2. In this type of sprain, the ligament is partially torn. You may be able to move your wrist, but not very much.  · Grade 3. In this type of sprain, the ligament or muscle is completely torn. You may find it difficult or extremely painful to move your wrist even a little.  What are the causes?  A wrist sprain can be caused by using the wrist too much during sports, exercise, or at work. It can also happen with a fall or during an accident.  What increases the risk?  This condition is more likely to occur in people:  · With a previous wrist or arm injury.  · With poor wrist strength and flexibility.  · Who play contact sports, such as football or soccer.  · Who play sports that may result in a fall, such as skateboarding, biking, skiing, or snowboarding.  · Who do not exercise regularly.  · Who use exercise equipment that does not fit well.  What are the signs or symptoms?  Symptoms of this condition include:  · Pain in the wrist, arm, or hand.  · Swelling or bruised skin near the wrist, hand, or arm. The skin may look yellow or kind of blue.  · Stiffness or trouble moving the hand.  · Hearing a pop or feeling a tear at the time of the injury.  · A warm feeling in the skin around the wrist.  How is this diagnosed?  This condition is diagnosed with a physical exam. Sometimes an X-ray is taken to make sure a bone did not break. If your health care provider thinks that you tore a ligament, he or she may order an MRI of your wrist.  How is this treated?  This condition is treated by resting and applying ice to your wrist. Additional treatment may include:  · Medicine for pain and inflammation.  · A splint to keep your wrist still (immobilized).  · Exercises to  strengthen and stretch your wrist.  · Surgery. This may be done if the ligament is completely torn.  Follow these instructions at home:  If you have a splint:    · Do not put pressure on any part of the splint until it is fully hardened. This may take several hours.  · Wear the splint as told by your health care provider. Remove it only as told by your health care provider.  · Loosen the splint if your fingers tingle, become numb, or turn cold and blue.  · If your splint is not waterproof:  ? Do not let it get wet.  ? Cover it with a watertight covering when you take a bath or a shower.  · Keep the splint clean.  Managing pain, stiffness, and swelling    · If directed, put ice on the injured area.  ? If you have a removable splint, remove it as told by your health care provider.  ? Put ice in a plastic bag.  ? Place a towel between your skin and the bag or between the splint and the bag.  ? Leave the ice on for 20 minutes, 2-3 times per day.  · Move your fingers often to avoid stiffness and to lessen swelling.  · Raise (elevate) the injured area above the level of your heart while you are sitting or lying down.  Activity  · Rest your wrist. Do not do things that cause pain.  · Return to your normal activities as told by your health care provider. Ask your health care provider what activities are safe for you.  · Do exercises as told by your health care provider.  General instructions  · Take over-the-counter and prescription medicines only as told by your health care provider.  · Do not use any products that contain nicotine or tobacco, such as cigarettes and e-cigarettes. These can delay healing. If you need help quitting, ask your health care provider.  · Ask your health care provider when it is safe to drive if you have a splint.  · Keep all follow-up visits as told by your health care provider. This is important.  Contact a health care provider if:  · Your pain, bruising, or swelling gets worse.  · Your skin  becomes red, gets a rash, or has open sores.  · Your pain does not get better or it gets worse.  Get help right away if:  · You have a new or sudden sharp pain in the hand, arm, or wrist.  · You have tingling or numbness in your hand.  · Your fingers turn white, very red, or cold and blue.  · You cannot move your fingers.  This information is not intended to replace advice given to you by your health care provider. Make sure you discuss any questions you have with your health care provider.  Document Released: 08/21/2015 Document Revised: 11/30/2018 Document Reviewed: 07/06/2017  Elsevier Patient Education © 2020 [employer]

## 2020-11-18 ENCOUNTER — OCCUPATIONAL MEDICINE (OUTPATIENT)
Dept: OCCUPATIONAL MEDICINE | Facility: CLINIC | Age: 45
End: 2020-11-18
Payer: COMMERCIAL

## 2020-11-18 ENCOUNTER — APPOINTMENT (OUTPATIENT)
Dept: RADIOLOGY | Facility: IMAGING CENTER | Age: 45
End: 2020-11-18
Attending: PREVENTIVE MEDICINE
Payer: COMMERCIAL

## 2020-11-18 VITALS
WEIGHT: 185 LBS | BODY MASS INDEX: 25.06 KG/M2 | RESPIRATION RATE: 12 BRPM | HEIGHT: 72 IN | DIASTOLIC BLOOD PRESSURE: 80 MMHG | OXYGEN SATURATION: 96 % | SYSTOLIC BLOOD PRESSURE: 120 MMHG | HEART RATE: 93 BPM | TEMPERATURE: 98.9 F

## 2020-11-18 DIAGNOSIS — S67.91XD: ICD-10-CM

## 2020-11-18 PROCEDURE — 73110 X-RAY EXAM OF WRIST: CPT | Mod: TC,RT | Performed by: PREVENTIVE MEDICINE

## 2020-11-18 PROCEDURE — 99203 OFFICE O/P NEW LOW 30 MIN: CPT | Performed by: PREVENTIVE MEDICINE

## 2020-11-18 ASSESSMENT — FIBROSIS 4 INDEX: FIB4 SCORE: 0.89

## 2020-11-18 ASSESSMENT — PAIN SCALES - GENERAL: PAINLEVEL: 3=SLIGHT PAIN

## 2020-11-18 NOTE — PROGRESS NOTES
Subjective:     Pardeep Byrd is a 45 y.o. 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 He states that he was putting the front end of a truck back on and pulled on a bolt holding sway bar.  He states when he pulled the bolt, the sway bar came down and smashed  his dorsal right wrist.  He was seen in urgent care x1, x-rays of the right wrist were negative for acute findings. He was advised NSAIDs and work restrictions.  Patient does note some gradual improvement in symptoms.  He however does have continued tenderness on the radial wrist.  He also notes some difficulty with range of motion pain with movements.  Denies radiating pain, numbness or tingling.  Using the wrist brace and taking Aleve OTC for relief.  Working light duty.    ROS: All systems were reviewed on intake form, form was reviewed and signed. See scanned documents in media. Pertinent positives and negatives included in HPI.    PMH: No pertinent past medical history to this problem  MEDS: Medications were reviewed in Epic  ALLERGIES:   Allergies   Allergen Reactions   • Bee    • Milk Digestant [Lactase-Rennet]      SOCHX: Works as an  at [employer]  FH: No pertinent family history to this problem       Objective:     /80   Pulse 93   Temp 37.2 °C (98.9 °F) (Temporal)   Resp 12   Ht 1.829 m (6')   Wt 83.9 kg (185 lb)   SpO2 96%   BMI 25.09 kg/m²     Constitutional: Patient is in no acute distress. Appears well-developed and well-nourished.   HENT: Normocephalic and atraumatic. EOM are normal. No scleral icterus.   Cardiovascular: Normal rate.    Pulmonary/Chest: Effort normal. No respiratory distress.   Neurological: Patient is alert and oriented to person, place, and time.   Skin: Skin is warm and dry.   Psychiatric: Normal mood and affect. Behavior is normal.     Right hand/wrist: No gross deformity.   Tenderness over the anatomic snuffbox and radial wrist.  Essentially full range of motion with moderate discomfort.  Distal neurovascular intact.    XR Right Wrist: no acute deformity as read by me    Assessment/Plan:       1. Crushing injury of right wrist, hand, and finger, subsequent encounter  - DX-WRIST-COMPLETE 3+ RIGHT; Future    • Continue wrist brace as needed  • Continue OTC Aleve as needed  • Gentle range of motion exercises  • Restricted duty  • Follow-up 1.5 weeks    Differential diagnosis, natural history, supportive care, and indications for immediate follow-up discussed.

## 2021-06-04 NOTE — LETTER
[address],   [address] [place] [address]  Phone:  [phone] - Fax:  [phone]   Occupational Health [facility] Progress Report and Disability Certification  Date of Service: 11/18/2020   No Show:  No  Date / Time of Next Visit: 11/30/2020 @ 8:15 am   Claim Information   Patient Name: Pardeep Byrd  Claim Number:     Employer:   [employer]  Date of Injury: 11/11/2020     Insurer / TPA: [employer]  ID / SSN:     Occupation:   Diagnosis: The encounter diagnosis was Crushing injury of right wrist, hand, and finger, subsequent encounter.    Medical Information   Related to Industrial Injury? 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 He states that he was putting the front end of a truck back on and pulled on a bolt holding sway bar.  He states when he pulled the bolt, the sway bar came down and smashed  his dorsal right wrist.  He was seen in urgent care x1, x-rays of the right wrist were negative for acute findings. He was advised NSAIDs and work restrictions.  Patient does note some gradual improvement in symptoms.  He however does have continued tenderness on the radial wrist.  He also notes some difficulty with range of motion pain with movements.  Denies radiating pain, numbness or tingling.  Using the wrist brace and taking Aleve OTC for relief.  Working light duty.   Objective Findings: Right hand/wrist: No gross deformity.  Tenderness over the anatomic snuffbox and radial wrist.  Essentially full range of motion with moderate discomfort.  Distal neurovascular intact.    XR Right Wrist: no acute deformity as read by me   Pre-Existing Condition(s):     Assessment:   Condition Improved    Status: Additional Care Required  Permanent Disability:No    Plan:      Diagnostics:      Comments:  Continue wrist brace as needed  Continue OTC Aleve as needed  Gentle  range of motion exercises  Restricted duty  Follow-up 1.5 weeks    Disability Information   Status: Released to Restricted Duty    From:  11/18/2020  Through: 11/30/2020 Restrictions are: Temporary   Physical Restrictions   Sitting:    Standing:    Stooping:    Bending:      Squatting:    Walking:    Climbing:    Pushing:      Pulling:    Other:    Reaching Above Shoulder (L):   Reaching Above Shoulder (R):       Reaching Below Shoulder (L):    Reaching Below Shoulder (R):      Not to exceed Weight Limits   Carrying(hrs):   Weight Limit(lb):   Lifting(hrs):   Weight  Limit(lb):     Comments: Limit right hand to less than 5 pounds lift/push/pull.    Repetitive Actions   Hands: i.e. Fine Manipulations from Grasping:     Feet: i.e. Operating Foot Controls:     Driving / Operate Machinery:     Provider Name:   Hermelindo Valderrama D.O. Physician Signature:  Physician Name:     Clinic Name / Location: [address],   [address] [place] [id number] Clinic Phone Number: Dept: [phone]   Appointment Time: 8:00 Am Visit Start Time: 7:59 AM   Check-In Time:  7:58 Am Visit Discharge Time: 9 am    Original-Treating Physician or Chiropractor    Page 2-Insurer/TPA    Page 3-Employer    Page 4-Employee     Gall bladder stones  surgery 2013